# Patient Record
Sex: MALE | Race: WHITE | Employment: FULL TIME | URBAN - NONMETROPOLITAN AREA
[De-identification: names, ages, dates, MRNs, and addresses within clinical notes are randomized per-mention and may not be internally consistent; named-entity substitution may affect disease eponyms.]

---

## 2024-10-15 ENCOUNTER — OFFICE VISIT (OUTPATIENT)
Age: 24
End: 2024-10-15

## 2024-10-15 VITALS — HEIGHT: 73 IN | BODY MASS INDEX: 17.63 KG/M2 | WEIGHT: 133 LBS

## 2024-10-15 DIAGNOSIS — Z98.890 S/P RIGHT ROTATOR CUFF REPAIR: ICD-10-CM

## 2024-10-15 DIAGNOSIS — M25.511 RIGHT SHOULDER PAIN, UNSPECIFIED CHRONICITY: Primary | ICD-10-CM

## 2024-10-15 RX ORDER — HYDROCODONE BITARTRATE AND ACETAMINOPHEN 7.5; 325 MG/1; MG/1
1 TABLET ORAL EVERY 6 HOURS PRN
COMMUNITY
Start: 2024-09-03

## 2024-10-15 ASSESSMENT — ENCOUNTER SYMPTOMS
GASTROINTESTINAL NEGATIVE: 1
ALLERGIC/IMMUNOLOGIC NEGATIVE: 1
EYES NEGATIVE: 1
RESPIRATORY NEGATIVE: 1

## 2024-10-15 NOTE — PROGRESS NOTES
Burton Aguilar (:  2000) is a 24 y.o. male,Established patient, here for evaluation of the following chief complaint(s):  rt shoulder  (PT doing well, getting better)         Assessment & Plan  Right shoulder pain, unspecified chronicity       Orders:    External Referral To Physical Therapy    EMG NCV (12 NERVE CONDUCTION STUDIES); Future    S/P right rotator cuff repair     With respect of the patient's right revision rotator cuff repair, I do think that he is making some headway, though I am surprised with his persistent degree of fatigability and weakness with his biceps and brachialis musculature.  As a result, I would like to evaluate the patient's right upper extremity with an EMG.  I do think that he would benefit from continued formal physical therapy at this time.  I will allow him to advance his work restrictions allowing him to use his right upper extremity limiting it to 4 pounds of capacity.    PLAN:  I will bring the patient back after completion of the EMG to discuss the findings.         Return in about 4 weeks (around 2024).       Subjective   The patient is a pleasant 24-year-old male who sustained an injury to his right rotator cuff ultimately undergoing a right rotator cuff repair.  He experienced rerupture and persistent tearing of his rotator cuff now 12 weeks out from a revision right rotator cuff repair.  He states that physical therapy is finally demonstrating benefit, though he still has an impressive degree of fatigability of his right upper extremity.  He indicates that he has weakness with elbow flexion and curling currently.        Review of Systems   Constitutional: Negative.    HENT: Negative.     Eyes: Negative.    Respiratory: Negative.     Cardiovascular: Negative.    Gastrointestinal: Negative.    Skin: Negative.    Allergic/Immunologic: Negative.    Neurological: Negative.    Psychiatric/Behavioral: Negative.            Objective   Physical Exam   On

## 2024-10-15 NOTE — ASSESSMENT & PLAN NOTE
With respect of the patient's right revision rotator cuff repair, I do think that he is making some headway, though I am surprised with his persistent degree of fatigability and weakness with his biceps and brachialis musculature.  As a result, I would like to evaluate the patient's right upper extremity with an EMG.  I do think that he would benefit from continued formal physical therapy at this time.  I will allow him to advance his work restrictions allowing him to use his right upper extremity limiting it to 4 pounds of capacity.    PLAN:  I will bring the patient back after completion of the EMG to discuss the findings.

## 2024-10-15 NOTE — ASSESSMENT & PLAN NOTE
Orders:    External Referral To Physical Therapy    EMG NCV (11-12 NERVE CONDUCTION STUDIES); Future

## 2024-11-05 ENCOUNTER — OFFICE VISIT (OUTPATIENT)
Age: 24
End: 2024-11-05
Payer: COMMERCIAL

## 2024-11-05 VITALS — WEIGHT: 134 LBS | HEIGHT: 73 IN | BODY MASS INDEX: 17.76 KG/M2

## 2024-11-05 DIAGNOSIS — Z98.890 S/P RIGHT ROTATOR CUFF REPAIR: Primary | ICD-10-CM

## 2024-11-05 PROCEDURE — 99213 OFFICE O/P EST LOW 20 MIN: CPT | Performed by: PHYSICIAN ASSISTANT

## 2024-11-05 ASSESSMENT — ENCOUNTER SYMPTOMS
BACK PAIN: 0
COLOR CHANGE: 0

## 2024-11-05 NOTE — ASSESSMENT & PLAN NOTE
Seeing as there is no evidence of nerve damage, we do recommend continued physical therapy for strengthening and range of motion Guillen purposes.  He will continue with his current work restrictions with no more than 5 pounds of lifting and 8-hour shifts.  We will see him back in about 6 weeks for an update.

## 2024-11-05 NOTE — PROGRESS NOTES
[facility]  [facility]  [address] [place] [address]  Dept: [phone]  Dept Fax: [phone]  Loc: [phone]    Burton Aguilar is a 24 y.o. male who presents today for his medical conditions/complaints as noted below.  Burton Aguilar is complaining of Follow-up (NCS results)        HPI:   Patient is a 24-year-old male presenting to the clinic today several months out from his right revision rotator cuff repair.  Over his past few visits, he has noted continual difficulty and weakness with elbow flexion and bicep strengthening.  At his previous visit, Dr. Fulton elected to obtain a nerve conduction study to evaluate for any nerve damage.  I did review these results today in office with Dr. Fulton.  The nerve conduction study reveals no electrophysiologic evidence to suggest a cervical radiculopathy, brachial plexopathy, large fiber peripheral neuropathy, isolated mononeuropathy throughout the right upper extremity.  All nerve conduction responses were normal en route garde to amplitude, latency and conduction velocity.        History reviewed. No pertinent past medical history.    History reviewed. No pertinent surgical history.    History reviewed. No pertinent family history.    Social History     Tobacco Use    Smoking status: Never    Smokeless tobacco: Never   Substance Use Topics    Alcohol use: Never        Current Outpatient Medications   Medication Sig Dispense Refill    HYDROcodone-acetaminophen (NORCO) 7.5-325 MG per tablet Take 1 tablet by mouth every 6 hours as needed for Pain.       No current facility-administered medications for this visit.       No Known Allergies    Health Maintenance   Topic Date Due    Depression Screen  Never done    Varicella vaccine (1 of 2 - 13+ 2-dose series) Never done    HPV vaccine (1 - Male 3-dose series) Never done    HIV screen  Never done    Hepatitis C screen  Never done    Hepatitis B

## 2024-12-17 ENCOUNTER — OFFICE VISIT (OUTPATIENT)
Age: 24
End: 2024-12-17

## 2024-12-17 VITALS — WEIGHT: 130 LBS | HEIGHT: 73 IN | BODY MASS INDEX: 17.23 KG/M2

## 2024-12-17 DIAGNOSIS — Z98.890 S/P RIGHT ROTATOR CUFF REPAIR: Primary | ICD-10-CM

## 2024-12-17 ASSESSMENT — ENCOUNTER SYMPTOMS
BACK PAIN: 0
GASTROINTESTINAL NEGATIVE: 1
COLOR CHANGE: 0
RESPIRATORY NEGATIVE: 1
EYES NEGATIVE: 1
ALLERGIC/IMMUNOLOGIC NEGATIVE: 1

## 2024-12-17 NOTE — PROGRESS NOTES
gently advance his conditioning and strengthening.  I would like to leave him that a 10 pound weight restriction presently from a work status standpoint.    Return in about 6 weeks (around 1/28/2025).      PLAN:  I will see the patient back in 6 weeks.  Depending on his continued progression at that point, we may ultimately transition to work hardening.    Discussed use, benefits, and side effects of any prescribed medications. All patient questions were answered. Patient voiced understanding of care plan.   Patient was given educational materials - see patient instructions below.         Electronically signed by Axel Fulton MD on 12/17/2024 at 5:25 PM

## 2025-01-29 ENCOUNTER — OFFICE VISIT (OUTPATIENT)
Age: 25
End: 2025-01-29

## 2025-01-29 VITALS — HEIGHT: 73 IN | WEIGHT: 134 LBS | BODY MASS INDEX: 17.76 KG/M2

## 2025-01-29 DIAGNOSIS — Z98.890 S/P RIGHT ROTATOR CUFF REPAIR: Primary | ICD-10-CM

## 2025-01-29 ASSESSMENT — ENCOUNTER SYMPTOMS
RESPIRATORY NEGATIVE: 1
EYES NEGATIVE: 1
GASTROINTESTINAL NEGATIVE: 1
ALLERGIC/IMMUNOLOGIC NEGATIVE: 1

## 2025-01-29 NOTE — PROGRESS NOTES
Burton Aguilar (:  2000) is a 24 y.o. male,Established patient, here for evaluation of the following chief complaint(s):  Follow-up        Assessment & Plan  1. Right shoulder pain.  He reports a 5-10% improvement in range of motion and strength since the last visit. Physical therapy has advanced to include sled pulls, elastic band pull-ups, and 15-pound curls. Forward flexion is at 165 degrees, abduction at 160 degrees, and external rotation at 80 degrees. Abduction and internal rotation have improved to 4 out of 5, while external rotation remains at 3+ out of 5. He will continue with formal physical therapy. Work hardening is not recommended at this time due to the slow but steady progress. A new therapy order will be provided. He is advised to reach 25 pounds in isolated flexion curls before considering work hardening.    Follow-up  The patient will follow up in 6 weeks.       ICD-10-CM    1. S/P right rotator cuff repair  Z98.890 External Referral To Physical Therapy          Return in about 6 weeks (around 3/12/2025) for WC NX RT RCR.    SUBJECTIVE/OBJECTIVE:  History of Present Illness  The patient is a 24-year-old male who presents for evaluation of right shoulder pain.    He reports a gradual improvement in his condition, estimating a 5 to 10 percent increase in range of motion and strength since his last visit. He has been engaging in sled pulls and sledge exercises, as well as assisted pull-ups using an elastic band. His daily activities have also shown significant progress, with the ability to perform tasks such as brushing his teeth, washing his hair, and putting on glasses without difficulty. He notes that it took him nearly 2 years to be able to use both hands to fix his hair. He also mentions that he can now retrieve his phone from his pocket while seated, a task that previously caused discomfort. His therapy regimen has been gradually intensified, with an increase in weight from 10-pound

## 2025-03-12 ENCOUNTER — OFFICE VISIT (OUTPATIENT)
Age: 25
End: 2025-03-12

## 2025-03-12 VITALS — BODY MASS INDEX: 16.83 KG/M2 | WEIGHT: 127 LBS | HEIGHT: 73 IN

## 2025-03-12 DIAGNOSIS — Z98.890 S/P RIGHT ROTATOR CUFF REPAIR: Primary | ICD-10-CM

## 2025-03-12 ASSESSMENT — ENCOUNTER SYMPTOMS
ALLERGIC/IMMUNOLOGIC NEGATIVE: 1
GASTROINTESTINAL NEGATIVE: 1
RESPIRATORY NEGATIVE: 1
EYES NEGATIVE: 1

## 2025-03-12 NOTE — PROGRESS NOTES
[facility]  [facility]  [address]   [facility] [id number] [phone]       Burton Aguilar (:  2000) is a 24 y.o. male,Established patient, here for evaluation of the following chief complaint(s):  Follow-up (10 month PO Rt RCR Revision /Sx: 2024)        Assessment & Plan  1. Rotator cuff injury.  His functional metrics continue to show improvement, although lateral abduction remains weak.  Additionally, external rotation above the head is significantly limited. Despite these challenges, he is making progress towards maximum medical improvement (MMI). He will persist with his current therapy regimen for an additional 6 weeks, during which time further therapy sessions will be requested. The goal is to achieve MMI by mid-2025, which would be just under a year post-surgery. He is advised to avoid any activities that require persistent overhead work, but tasks that can be performed from the clavicles down are deemed reasonable.    Follow-up  The patient will follow up in 6 weeks.    PROCEDURE  The patient underwent rotator cuff surgery in May 2023.     No diagnosis found.    No follow-ups on file.    SUBJECTIVE/OBJECTIVE:  History of Present Illness  The patient presents for evaluation of rotator cuff.    He reports a perceived improvement of approximately 25 percent in his shoulder function, estimating it to be at 75 percent of its original capacity. He expresses satisfaction with this progress, noting that a return to 90 percent functionality would be acceptable. His current therapy regimen includes twice-weekly sessions on  and , focusing on work hardening and strengthening exercises. These sessions typically involve the use of foam rollers for loosening, followed by 20-pound curls (3 sets of 5), Arnold presses with 15 pounds (3 sets of 5), assisted pull-ups with an exercise band (3 sets of 4),

## 2025-04-09 ENCOUNTER — TELEPHONE (OUTPATIENT)
Age: 25
End: 2025-04-09

## 2025-04-09 DIAGNOSIS — Z98.890 S/P RIGHT ROTATOR CUFF REPAIR: Primary | ICD-10-CM

## 2025-04-09 NOTE — TELEPHONE ENCOUNTER
Mayra from [facility] in [place] is requesting a new order auth for PT so they continue care for patient.    Mayra: [phone]  FAX:[phone]

## 2025-04-11 NOTE — TELEPHONE ENCOUNTER
PT called and said they have not got the order yet    Normal vision: sees adequately in most situations; can see medication labels, newsprint